# Patient Record
Sex: MALE | Race: WHITE | NOT HISPANIC OR LATINO | Employment: FULL TIME | URBAN - NONMETROPOLITAN AREA
[De-identification: names, ages, dates, MRNs, and addresses within clinical notes are randomized per-mention and may not be internally consistent; named-entity substitution may affect disease eponyms.]

---

## 2017-07-13 ENCOUNTER — OFFICE VISIT (OUTPATIENT)
Dept: FAMILY MEDICINE | Facility: CLINIC | Age: 32
End: 2017-07-13
Payer: COMMERCIAL

## 2017-07-13 VITALS
SYSTOLIC BLOOD PRESSURE: 110 MMHG | RESPIRATION RATE: 18 BRPM | DIASTOLIC BLOOD PRESSURE: 82 MMHG | WEIGHT: 171 LBS | BODY MASS INDEX: 22.66 KG/M2 | TEMPERATURE: 97.2 F | HEIGHT: 73 IN | HEART RATE: 88 BPM

## 2017-07-13 DIAGNOSIS — R09.81 NASAL CONGESTION: ICD-10-CM

## 2017-07-13 DIAGNOSIS — G43.909 MIGRAINE WITHOUT STATUS MIGRAINOSUS, NOT INTRACTABLE, UNSPECIFIED MIGRAINE TYPE: Primary | ICD-10-CM

## 2017-07-13 DIAGNOSIS — Z72.0 TOBACCO ABUSE: ICD-10-CM

## 2017-07-13 PROCEDURE — 99213 OFFICE O/P EST LOW 20 MIN: CPT | Performed by: FAMILY MEDICINE

## 2017-07-13 RX ORDER — LAMOTRIGINE 100 MG/1
100 TABLET ORAL DAILY
COMMUNITY
Start: 2016-08-24

## 2017-07-13 RX ORDER — METHYLPHENIDATE HYDROCHLORIDE 20 MG/1
20 CAPSULE, EXTENDED RELEASE ORAL
COMMUNITY
Start: 2017-06-21

## 2017-07-13 NOTE — PROGRESS NOTES
SUBJECTIVE:                                                    Bryce Krause is a 32 year old male who presents to clinic today for the following health issues:      Migraine Follow-Up    Headaches symptoms:  Worsened- has been cruz stuffy and sinus issues as well- not sure if they are making his migraines worse     Frequency: 3-4 TIMES A WEEK     Duration of headaches: hours- sometimes all day    Able to do normal daily activities/work with migraines: Yes- sometimes- not always     Rescue/Relief medication:sumatriptan (Imitrex)              Effectiveness: some relief- but makes him sick to stomach- making him vomit     Preventative medication: lamotrigine     Neurologic complications: No new stroke-like symptoms, loss of vision or speech, numbness or weakness    In the past 4 weeks, how often have you gone to Urgent Care or the emergency room because of your headaches?  0     No family history of migraine which he know of.     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        Problem list and histories reviewed & adjusted, as indicated.  Additional history: as documented    Patient Active Problem List   Diagnosis     Tobacco abuse     CARDIOVASCULAR SCREENING; LDL GOAL LESS THAN 160     Generalized anxiety disorder     Insomnia     Tension headache     Migraine     NSAID-associated gastropathy     NSAID-induced duodenal ulcer     NSAID-induced gastric ulcer     Intractable headache     Depression with anxiety     Past Surgical History:   Procedure Laterality Date     COLONOSCOPY N/A 12/24/2014    Procedure: COLONOSCOPY;  Surgeon: Kian Sarmiento MD;  Location:  GI     ESOPHAGOSCOPY, GASTROSCOPY, DUODENOSCOPY (EGD), COMBINED N/A 4/16/2015    Procedure: COMBINED ESOPHAGOSCOPY, GASTROSCOPY, DUODENOSCOPY (EGD), BIOPSY SINGLE OR MULTIPLE;  Surgeon: Kian Fitch MD;  Location:  GI     GI SURGERY  4/2014    EGD with biopsies     SURGICAL HISTORY OF -        right hand mass removed:  calcium deposit.       Social History   Substance Use Topics     Smoking status: Current Some Day Smoker     Packs/day: 0.25     Years: 8.00     Types: Cigarettes     Smokeless tobacco: Never Used     Alcohol use No     Family History   Problem Relation Age of Onset     CANCER Mother      Non-hodgkins lymphoma     DIABETES Paternal Uncle      Psychotic Disorder Father      paranoia     HEART DISEASE Father      Hypertension Father      Alzheimer Disease Paternal Grandfather      Cancer - colorectal No family hx of          Current Outpatient Prescriptions   Medication Sig Dispense Refill     lamoTRIgine (LAMICTAL) 100 MG tablet Take 100 mg by mouth daily       SUMAtriptan (IMITREX) 100 MG tablet Take 1 tablet (100 mg) by mouth at onset of headache for migraine May repeat in 2 hours if needed: max 2/day 18 tablet 3     pantoprazole (PROTONIX) 40 MG enteric coated tablet Take 1 tablet (40 mg) by mouth daily Take 30-60 minutes before a meal. 30 tablet 11     Acetaminophen (TYLENOL PO) Take 650 mg by mouth every 6 hours as needed        EPINEPHrine (EPIPEN 2-TYRA) 0.3 MG/0.3ML injection Inject 0.3 mLs into the muscle once as needed for anaphylaxis for 1 dose. 2 each 3     methylphenidate (RITALIN LA) 20 MG CP24 Take 20 mg by mouth       Allergies   Allergen Reactions     Azithromycin Anaphylaxis     Levaquin Anaphylaxis     Erythromycin      Penicillins      Sulfa Drugs      Recent Labs   Lab Test  07/14/15   2003  05/14/15   1658  04/30/15   1631  04/14/15   2025   02/17/12   1501   11/07/11   1446   ALT   --   21  15  22   --   21   < >   --    CR  0.77  0.90  0.86  0.95   < >  0.80   --    --    GFRESTIMATED  >90  Non  GFR Calc    >90  Non  GFR Calc    >90  Non  GFR Calc    >90  Non  GFR Calc     < >  >90   --    --    GFRESTBLACK  >90   GFR Calc    >90   GFR Calc    >90   GFR  "Calc    >90   GFR Calc     < >  >90   --    --    POTASSIUM  3.5  4.2  4.3  4.2   < >  4.5   --    --    TSH   --    --    --    --    --   0.68   --   0.68    < > = values in this interval not displayed.      BP Readings from Last 3 Encounters:   07/13/17 110/82   10/27/15 112/72   10/16/15 118/74    Wt Readings from Last 3 Encounters:   07/13/17 171 lb (77.6 kg)   10/27/15 156 lb 1.6 oz (70.8 kg)   10/16/15 156 lb 3.2 oz (70.9 kg)                  Labs reviewed in EPIC    Reviewed and updated as needed this visit by clinical staff       Reviewed and updated as needed this visit by Provider         ROS:  Constitutional, HEENT, cardiovascular, pulmonary, gi and gu systems are negative, except as otherwise noted.    OBJECTIVE:     /82 (Cuff Size: Adult Regular)  Pulse 88  Temp 97.2  F (36.2  C) (Tympanic)  Resp 18  Ht 6' 1\" (1.854 m)  Wt 171 lb (77.6 kg)  BMI 22.56 kg/m2  Body mass index is 22.56 kg/(m^2).  GENERAL: healthy, alert and no distress  EYES: Eyes grossly normal to inspection, PERRL and conjunctivae and sclerae normal  HENT: ear canals and TM's normal, nose and mouth without ulcers or lesions  NECK: no adenopathy, no asymmetry, masses, or scars and thyroid normal to palpation  RESP: lungs clear to auscultation - no rales, rhonchi or wheezes  CV: regular rate and rhythm, normal S1 S2, no S3 or S4, no murmur, click or rub, no peripheral edema and peripheral pulses strong  ABDOMEN: soft, nontender, no hepatosplenomegaly, no masses and bowel sounds normal  MS: no gross musculoskeletal defects noted, no edema      ASSESSMENT/PLAN:           ICD-10-CM    1. Migraine without status migrainosus, not intractable, unspecified migraine type G43.909 NEUROLOGY ADULT REFERRAL   2. Nasal congestion R09.81    3. Tobacco abuse Z72.0        32-year-old male presents for a follow-up visit. Known to have migraine and taking Imitrex and lamotrigine which are not reliably controlling the symptoms. " Has used Excedrin in the past but stopped due to heartburn and gastric ulcers. Neurology referral placed for expert opinion and further management.   Nasal symptoms likely secondary to seasonal allergies, recommended to continue over-the-counter antihistamine for now. Smoking cessation counseling provided not ready to quit currently.    Patient understood and in agreement with the above plan. All questions answered.        Kenny Martinez MD  [facility]

## 2017-07-13 NOTE — LETTER
[address] [address]  Phone: [phone]  Fax: [phone]     July 13, 2017      Bryce Krause  [address] [facility] STAFF [place] [id number]              To whom it may concern,    Bryce Krause was seen in our clinic today 07/13/2017.       Sincerely,          Kenny Martinez MD

## 2017-07-13 NOTE — NURSING NOTE
"Chief Complaint   Patient presents with     Headache       Initial /82 (Cuff Size: Adult Regular)  Pulse 88  Temp 97.2  F (36.2  C) (Tympanic)  Resp 18  Ht 6' 1\" (1.854 m)  Wt 171 lb (77.6 kg)  BMI 22.56 kg/m2 Estimated body mass index is 22.56 kg/(m^2) as calculated from the following:    Height as of this encounter: 6' 1\" (1.854 m).    Weight as of this encounter: 171 lb (77.6 kg).  Medication Reconciliation: complete    Health Maintenance that is potentially due pending provider review:  NONE    n/a    "

## 2017-07-13 NOTE — MR AVS SNAPSHOT
After Visit Summary   7/13/2017    Bryce Krause    MRN: [id number]           Patient Information     Date Of Birth          1985        Visit Information        Provider Department      7/13/2017 11:00 AM Kenny Martinez MD [facility]        Today's Diagnoses     Migraine without status migrainosus, not intractable, unspecified migraine type    -  1    Nasal congestion        Tobacco abuse           Follow-ups after your visit        Additional Services     NEUROLOGY ADULT REFERRAL       Your provider has referred you for the following:   [place]ult [place] [facility] [phone]   [email or web address]    Please be aware that coverage of these services is subject to the terms and limitations of your health insurance plan.  Call member services at your health plan with any benefit or coverage questions.      Please bring the following with you to your appointment:    (1) Any X-Rays, CTs or MRIs which have been performed.  Contact the facility where they were done to arrange for  prior to your scheduled appointment.    (2) List of current medications  (3) This referral request   (4) Any documents/labs given to you for this referral                  Who to contact     If you have questions or need follow up information about today's clinic visit or your schedule please contact [facility] directly at [phone].  Normal or non-critical lab and imaging results will be communicated to you by MyChart, letter or phone within 4 business days after the clinic has received the results. If you do not hear from us within 7 days, please contact the clinic through MyChart or phone. If you have a critical or abnormal lab result, we will notify you by phone as soon as possible.  Submit refill requests through [employer] or call your pharmacy and they will forward the refill request to us. Please allow 3 business days for your refill to  "be completed.          Additional Information About Your Visit        [employer] Information     [employer] gives you secure access to your electronic health record. If you see a primary care provider, you can also send messages to your care team and make appointments. If you have questions, please call your primary care clinic.  If you do not have a primary care provider, please call [phone] and they will assist you.        Care EveryWhere ID     This is your Care EveryWhere ID. This could be used by other organizations to access your [place] medical records  [id number]        Your Vitals Were     Pulse Temperature Respirations Height BMI (Body Mass Index)       88 97.2  F (36.2  C) (Tympanic) 18 6' 1\" (1.854 m) 22.56 kg/m2        Blood Pressure from Last 3 Encounters:   07/13/17 110/82   10/27/15 112/72   10/16/15 118/74    Weight from Last 3 Encounters:   07/13/17 171 lb (77.6 kg)   10/27/15 156 lb 1.6 oz (70.8 kg)   10/16/15 156 lb 3.2 oz (70.9 kg)              We Performed the Following     NEUROLOGY ADULT REFERRAL          Today's Medication Changes          These changes are accurate as of: 7/13/17 11:29 AM.  If you have any questions, ask your nurse or doctor.               These medicines have changed or have updated prescriptions.        Dose/Directions    EPINEPHrine 0.3 MG/0.3ML injection   Commonly known as:  EPIPEN 2-TYRA   This may have changed:  Another medication with the same name was removed. Continue taking this medication, and follow the directions you see here.   Used for:  Anaphylactic reactions   Changed by:  Berny Mcgarry PA-C        Dose:  0.3 mg   Inject 0.3 mLs into the muscle once as needed for anaphylaxis for 1 dose.   Quantity:  2 each   Refills:  3         Stop taking these medicines if you haven't already. Please contact your care team if you have questions.     busPIRone 15 MG tablet   Commonly known as:  BUSPAR   Stopped by:  Kenny Martinez MD           busPIRone 5 MG " tablet   Commonly known as:  BUSPAR   Stopped by:  Kenny Martinez MD           cefdinir 300 MG capsule   Commonly known as:  OMNICEF   Stopped by:  Kenny Martinez MD           nicotine 14 MG/24HR 24 hr patch   Commonly known as:  NICODERM CQ   Stopped by:  Kenny Martinez MD           ondansetron 4 MG tablet   Commonly known as:  ZOFRAN   Stopped by:  Kenny Martinez MD           prochlorperazine 10 MG tablet   Commonly known as:  COMPAZINE   Stopped by:  Kenny Martinez MD                    Primary Care Provider Office Phone # Fax #    Judd RADHA MD Kory [phone]       [facility] [address] [place] [address]        Equal Access to Services     [facility]: Hadii aad ku hadasho Soomaali, waaxda luqadaha, qaybta kaalmada adeegyada, waxay idiin hayaan izzy armijo . So [facility] [phone].    ATENCIÓN: Si habla español, tiene a tellez disposición servicios gratuitos de asistencia lingüística. Llame al [phone].    We comply with applicable federal civil rights laws and [place] laws. We do not discriminate on the basis of race, color, national origin, age, disability sex, sexual orientation or gender identity.            Thank you!     Thank you for choosing [facility]  for your care. Our goal is always to provide you with excellent care. Hearing back from our patients is one way we can continue to improve our services. Please take a few minutes to complete the written survey that you may receive in the mail after your visit with us. Thank you!             Your Updated Medication List - Protect others around you: Learn how to safely use, store and throw away your medicines at www.disposemymeds.org.          This list is accurate as of: 7/13/17 11:29 AM.  Always use your most recent med list.                   Brand Name Dispense Instructions for use Diagnosis    EPINEPHrine 0.3 MG/0.3ML injection    EPIPEN 2-TYRA    2 each    Inject  0.3 mLs into the muscle once as needed for anaphylaxis for 1 dose.    Anaphylactic reactions       lamoTRIgine 100 MG tablet    LaMICtal     Take 100 mg by mouth daily        methylphenidate 20 MG Cp24    RITALIN LA     Take 20 mg by mouth        pantoprazole 40 MG EC tablet    PROTONIX    30 tablet    Take 1 tablet (40 mg) by mouth daily Take 30-60 minutes before a meal.    Peptic ulcer       SUMAtriptan 100 MG tablet    IMITREX    18 tablet    Take 1 tablet (100 mg) by mouth at onset of headache for migraine May repeat in 2 hours if needed: max 2/day    Migraine without status migrainosus, not intractable, unspecified migraine type       TYLENOL PO      Take 650 mg by mouth every 6 hours as needed

## 2019-12-09 ENCOUNTER — HEALTH MAINTENANCE LETTER (OUTPATIENT)
Age: 34
End: 2019-12-09

## 2021-01-14 ENCOUNTER — HEALTH MAINTENANCE LETTER (OUTPATIENT)
Age: 36
End: 2021-01-14

## 2021-10-24 ENCOUNTER — HEALTH MAINTENANCE LETTER (OUTPATIENT)
Age: 36
End: 2021-10-24

## 2022-02-13 ENCOUNTER — HEALTH MAINTENANCE LETTER (OUTPATIENT)
Age: 37
End: 2022-02-13

## 2022-10-15 ENCOUNTER — HEALTH MAINTENANCE LETTER (OUTPATIENT)
Age: 37
End: 2022-10-15

## 2023-03-26 ENCOUNTER — HEALTH MAINTENANCE LETTER (OUTPATIENT)
Age: 38
End: 2023-03-26

## 2023-06-14 ENCOUNTER — APPOINTMENT (OUTPATIENT)
Dept: GENERAL RADIOLOGY | Facility: CLINIC | Age: 38
End: 2023-06-14
Attending: EMERGENCY MEDICINE
Payer: COMMERCIAL

## 2023-06-14 ENCOUNTER — HOSPITAL ENCOUNTER (EMERGENCY)
Facility: CLINIC | Age: 38
Discharge: HOME | End: 2023-06-14
Attending: EMERGENCY MEDICINE | Admitting: EMERGENCY MEDICINE
Payer: COMMERCIAL

## 2023-06-14 VITALS
DIASTOLIC BLOOD PRESSURE: 103 MMHG | OXYGEN SATURATION: 99 % | HEART RATE: 90 BPM | SYSTOLIC BLOOD PRESSURE: 122 MMHG | RESPIRATION RATE: 18 BRPM | TEMPERATURE: 97.6 F

## 2023-06-14 DIAGNOSIS — R07.9 CHEST PAIN, UNSPECIFIED TYPE: ICD-10-CM

## 2023-06-14 LAB
ANION GAP SERPL CALCULATED.3IONS-SCNC: 12 MMOL/L (ref 7–15)
BASOPHILS # BLD AUTO: 0 10E3/UL (ref 0–0.2)
BASOPHILS NFR BLD AUTO: 0 %
BUN SERPL-MCNC: 16.4 MG/DL (ref 6–20)
CALCIUM SERPL-MCNC: 9.7 MG/DL (ref 8.6–10)
CHLORIDE SERPL-SCNC: 103 MMOL/L (ref 98–107)
CREAT SERPL-MCNC: 1 MG/DL (ref 0.67–1.17)
DEPRECATED HCO3 PLAS-SCNC: 23 MMOL/L (ref 22–29)
EOSINOPHIL # BLD AUTO: 0.1 10E3/UL (ref 0–0.7)
EOSINOPHIL NFR BLD AUTO: 2 %
ERYTHROCYTE [DISTWIDTH] IN BLOOD BY AUTOMATED COUNT: 12.3 % (ref 10–15)
GFR SERPL CREATININE-BSD FRML MDRD: >90 ML/MIN/1.73M2
GLUCOSE SERPL-MCNC: 129 MG/DL (ref 70–99)
HCT VFR BLD AUTO: 42.9 % (ref 40–53)
HGB BLD-MCNC: 14.7 G/DL (ref 13.3–17.7)
HOLD SPECIMEN: NORMAL
HOLD SPECIMEN: NORMAL
IMM GRANULOCYTES # BLD: 0 10E3/UL
IMM GRANULOCYTES NFR BLD: 0 %
LYMPHOCYTES # BLD AUTO: 1.7 10E3/UL (ref 0.8–5.3)
LYMPHOCYTES NFR BLD AUTO: 25 %
MCH RBC QN AUTO: 30.2 PG (ref 26.5–33)
MCHC RBC AUTO-ENTMCNC: 34.3 G/DL (ref 31.5–36.5)
MCV RBC AUTO: 88 FL (ref 78–100)
MONOCYTES # BLD AUTO: 0.4 10E3/UL (ref 0–1.3)
MONOCYTES NFR BLD AUTO: 6 %
NEUTROPHILS # BLD AUTO: 4.6 10E3/UL (ref 1.6–8.3)
NEUTROPHILS NFR BLD AUTO: 67 %
NRBC # BLD AUTO: 0 10E3/UL
NRBC BLD AUTO-RTO: 0 /100
PLATELET # BLD AUTO: 300 10E3/UL (ref 150–450)
POTASSIUM SERPL-SCNC: 4.3 MMOL/L (ref 3.4–5.3)
RBC # BLD AUTO: 4.87 10E6/UL (ref 4.4–5.9)
SODIUM SERPL-SCNC: 138 MMOL/L (ref 136–145)
TROPONIN T SERPL HS-MCNC: <6 NG/L
WBC # BLD AUTO: 6.9 10E3/UL (ref 4–11)

## 2023-06-14 PROCEDURE — 36415 COLL VENOUS BLD VENIPUNCTURE: CPT | Performed by: EMERGENCY MEDICINE

## 2023-06-14 PROCEDURE — 99285 EMERGENCY DEPT VISIT HI MDM: CPT | Mod: 25

## 2023-06-14 PROCEDURE — 93005 ELECTROCARDIOGRAM TRACING: CPT | Mod: RTG

## 2023-06-14 PROCEDURE — 85025 COMPLETE CBC W/AUTO DIFF WBC: CPT | Performed by: EMERGENCY MEDICINE

## 2023-06-14 PROCEDURE — 84484 ASSAY OF TROPONIN QUANT: CPT | Performed by: EMERGENCY MEDICINE

## 2023-06-14 PROCEDURE — 80048 BASIC METABOLIC PNL TOTAL CA: CPT | Performed by: EMERGENCY MEDICINE

## 2023-06-14 PROCEDURE — 71046 X-RAY EXAM CHEST 2 VIEWS: CPT

## 2023-06-14 NOTE — ED PROVIDER NOTES
"  History     Chief Complaint:  Chest Pain       HPI   Bryce Krause is a 37 year old male who presents to the ED with upper left chest pain around the left shoulder area that started a couple of days ago when the patient believes that he pulled a muscle and came in today as the pain worsened. The patient described the pain as a \"strong, sharp acheiness\" and is experiencing some shortness of breath. He has never felt this type of pain before, but does sometimes experience upper back and neck pain. The patient has been taking Prilosec. The patient admits to using vapes, but used to smoke cigarettes. He also experiences some migraines. The patient denies having any activities that make the pain better or worse and denies having a cough or traveling recently.    Of note, the patient's father had heart problems around the age of 45 which has made the patient concerned as a result.      Independent Historian:   None - Patient Only    Review of External Notes:   None      Medications:   Prilosec   Epipen  Lamictal  Ritalin  Protonix  Imitrex    Past Medical History:    Chronic headaches  Depressive disorder  Viral meningitis  Current Smoker  Anxiety  Adult ADHD  Panic Disorder  Bipolar 1 disorder  ADD  Gastric ulcers    Past Surgical History:    Colonoscopy  Esophagoscopy  Gastroscopy  Duodenoscopy  GI surery  Surgical removal of mass, right hand  Inguinal hernia repair  Right shoulder AC resection  Decompression-hemilaminotomy/discetomy, L4 to L5 bilaterally  IMO dental surgery     Physical Exam     Patient Vitals for the past 24 hrs:   BP Temp Temp src Pulse Resp SpO2   06/14/23 1443 (!) 140/117 97.6  F (36.4  C) Temporal 99 18 99 %        Physical Exam  General: Patient is alert and cooperative.  HENT:  Normal nose, oropharynx. Moist oral mucosa.  Eyes: EOMI. Normal conjunctiva.  Neck:  Normal range of motion and appearance.   Cardiovascular:  Normal rate, regular rhythm and normal heart sounds.   Pulmonary/Chest:  Effort " normal. No wheezing or crackles.  Abdominal: Soft. No distension or tenderness.     Musculoskeletal: Normal range of motion. No edema or tenderness.   Neurological: oriented, normal strength, sensation, and coordination.   Skin: Warm and dry. No rash or bruising.   Psychiatric: Normal mood and affect. Normal behavior and judgement.      Emergency Department Course       Imaging:  XR Chest 2 Views   Final Result   IMPRESSION: Negative chest.         Report per radiology    Laboratory:  Labs Ordered and Resulted from Time of ED Arrival to Time of ED Departure   BASIC METABOLIC PANEL - Abnormal       Result Value    Sodium 138      Potassium 4.3      Chloride 103      Carbon Dioxide (CO2) 23      Anion Gap 12      Urea Nitrogen 16.4      Creatinine 1.00      Calcium 9.7      Glucose 129 (*)     GFR Estimate >90     TROPONIN T, HIGH SENSITIVITY - Normal    Troponin T, High Sensitivity <6     CBC WITH PLATELETS AND DIFFERENTIAL    WBC Count 6.9      RBC Count 4.87      Hemoglobin 14.7      Hematocrit 42.9      MCV 88      MCH 30.2      MCHC 34.3      RDW 12.3      Platelet Count 300      % Neutrophils 67      % Lymphocytes 25      % Monocytes 6      % Eosinophils 2      % Basophils 0      % Immature Granulocytes 0      NRBCs per 100 WBC 0      Absolute Neutrophils 4.6      Absolute Lymphocytes 1.7      Absolute Monocytes 0.4      Absolute Eosinophils 0.1      Absolute Basophils 0.0      Absolute Immature Granulocytes 0.0      Absolute NRBCs 0.0          Emergency Department Course & Assessments:    Interventions:  Medications - No data to display     Assessments:  1359 I obtained history and examined the patient as noted above.    Independent Interpretation (X-rays, CTs, rhythm strip):  None    Consultations/Discussion of Management or Tests:  None        Social Determinants of Health affecting care:   None    Disposition:  The patient was discharged to home.     Impression & Plan      Medical Decision Making:  Bryce DAS  Sax presents with chest pain.  The work up in the Emergency Department is negative.  The differential diagnosis of chest pain is broad and includes life threatening etiologies such as Acute coronary syndrome, Myocardial infarction, Pulmonary Embolism, and Acute Aortic Dissection.  Other causes may include pneumonia, pneumothorax, pericarditis, pleurisy, and esophageal spasm.  No serious etiology for the chest pain was detected today during this visit.      Close follow up with primary care is indicated should the pain continue, as further work up may be performed; this was made clear to Bryce, who understands.    Diagnosis:    ICD-10-CM    1. Chest pain, unspecified type  R07.9               Scribe Disclosure:  THU, Emile Estevez [employer], am serving as a scribe at 4:30 PM on 6/14/2023 to document services personally performed by Wilbur Abrams MD based on my observations and the provider's statements to me.        Wilbur Abrams MD  06/19/23 1409

## 2023-06-14 NOTE — ED TRIAGE NOTES
A&O x4.  ABC's intact.      Pt arrives with c/o chest pain into back that started couple days ago but worse today with SOB>      Triage Assessment     Row Name 06/14/23 1442       Triage Assessment (Adult)    Airway WDL WDL       Respiratory WDL    Respiratory WDL WDL       Skin Circulation/Temperature WDL    Skin Circulation/Temperature WDL WDL       Cardiac WDL    Cardiac WDL chest pain       Peripheral/Neurovascular WDL    Peripheral Neurovascular WDL WDL       Cognitive/Neuro/Behavioral WDL    Cognitive/Neuro/Behavioral WDL WDL

## 2023-06-15 LAB
ATRIAL RATE - MUSE: 83 BPM
DIASTOLIC BLOOD PRESSURE - MUSE: NORMAL MMHG
INTERPRETATION ECG - MUSE: NORMAL
P AXIS - MUSE: 58 DEGREES
PR INTERVAL - MUSE: 162 MS
QRS DURATION - MUSE: 82 MS
QT - MUSE: 334 MS
QTC - MUSE: 392 MS
R AXIS - MUSE: 48 DEGREES
SYSTOLIC BLOOD PRESSURE - MUSE: NORMAL MMHG
T AXIS - MUSE: 53 DEGREES
VENTRICULAR RATE- MUSE: 83 BPM

## 2024-05-26 ENCOUNTER — HEALTH MAINTENANCE LETTER (OUTPATIENT)
Age: 39
End: 2024-05-26

## 2025-06-14 ENCOUNTER — HEALTH MAINTENANCE LETTER (OUTPATIENT)
Age: 40
End: 2025-06-14